# Patient Record
Sex: FEMALE | Race: WHITE | Employment: OTHER | URBAN - METROPOLITAN AREA
[De-identification: names, ages, dates, MRNs, and addresses within clinical notes are randomized per-mention and may not be internally consistent; named-entity substitution may affect disease eponyms.]

---

## 2017-04-06 PROBLEM — H35.3132 INTERMEDIATE STAGE NONEXUDATIVE AGE-RELATED MACULAR DEGENERATION OF BOTH EYES: Status: ACTIVE | Noted: 2017-04-06

## 2017-05-01 PROBLEM — I77.9 BILATERAL CAROTID ARTERY DISEASE (HCC): Status: ACTIVE | Noted: 2017-05-01

## 2017-05-01 PROBLEM — I11.0 HYPERTENSIVE CHF (CONGESTIVE HEART FAILURE) (HCC): Status: ACTIVE | Noted: 2017-05-01

## 2017-05-01 PROBLEM — I70.0 AORTIC ATHEROSCLEROSIS (HCC): Status: ACTIVE | Noted: 2017-05-01

## 2018-03-16 PROBLEM — M19.049: Status: ACTIVE | Noted: 2018-03-16

## 2018-04-09 PROCEDURE — 86200 CCP ANTIBODY: CPT | Performed by: INTERNAL MEDICINE

## 2018-04-20 PROCEDURE — 86803 HEPATITIS C AB TEST: CPT | Performed by: INTERNAL MEDICINE

## 2018-04-20 PROCEDURE — 87340 HEPATITIS B SURFACE AG IA: CPT | Performed by: INTERNAL MEDICINE

## 2018-04-20 PROCEDURE — 86704 HEP B CORE ANTIBODY TOTAL: CPT | Performed by: INTERNAL MEDICINE

## 2018-05-31 PROBLEM — M05.9 SEROPOSITIVE RHEUMATOID ARTHRITIS (HCC): Status: ACTIVE | Noted: 2018-05-31

## 2018-05-31 PROBLEM — I27.20 PULMONARY HTN (HCC): Status: ACTIVE | Noted: 2018-05-31

## 2018-06-05 PROBLEM — H35.30 MACULAR DEGENERATION, UNSPECIFIED LATERALITY, UNSPECIFIED TYPE: Status: ACTIVE | Noted: 2018-06-05

## 2018-06-05 PROBLEM — I27.20 PULMONARY HTN (HCC): Status: RESOLVED | Noted: 2018-05-31 | Resolved: 2018-06-05

## 2018-06-05 PROBLEM — M19.049: Status: RESOLVED | Noted: 2018-03-16 | Resolved: 2018-06-05

## 2018-07-12 PROCEDURE — 82607 VITAMIN B-12: CPT | Performed by: INTERNAL MEDICINE

## 2018-08-15 PROCEDURE — 86480 TB TEST CELL IMMUN MEASURE: CPT | Performed by: INTERNAL MEDICINE

## 2018-08-27 PROCEDURE — 86480 TB TEST CELL IMMUN MEASURE: CPT | Performed by: INTERNAL MEDICINE

## 2019-02-27 ENCOUNTER — LAB ENCOUNTER (OUTPATIENT)
Dept: LAB | Age: 74
End: 2019-02-27
Attending: ORTHOPAEDIC SURGERY
Payer: MEDICARE

## 2019-02-27 DIAGNOSIS — Z01.812 PRE-OPERATIVE LABORATORY EXAMINATION: ICD-10-CM

## 2019-02-27 DIAGNOSIS — M19.011 PRIMARY OSTEOARTHRITIS OF RIGHT SHOULDER: ICD-10-CM

## 2019-02-27 LAB
ALBUMIN SERPL-MCNC: 3.4 G/DL (ref 3.4–5)
ALBUMIN/GLOB SERPL: 0.9 {RATIO} (ref 1–2)
ALP LIVER SERPL-CCNC: 83 U/L (ref 55–142)
ALT SERPL-CCNC: 18 U/L (ref 13–56)
ANION GAP SERPL CALC-SCNC: 6 MMOL/L (ref 0–18)
AST SERPL-CCNC: 18 U/L (ref 15–37)
BILIRUB SERPL-MCNC: 0.4 MG/DL (ref 0.1–2)
BUN BLD-MCNC: 34 MG/DL (ref 7–18)
BUN/CREAT SERPL: 38.2 (ref 10–20)
CALCIUM BLD-MCNC: 9.1 MG/DL (ref 8.5–10.1)
CHLORIDE SERPL-SCNC: 111 MMOL/L (ref 98–107)
CO2 SERPL-SCNC: 24 MMOL/L (ref 21–32)
CREAT BLD-MCNC: 0.89 MG/DL (ref 0.55–1.02)
GLOBULIN PLAS-MCNC: 3.6 G/DL (ref 2.8–4.4)
GLUCOSE BLD-MCNC: 92 MG/DL (ref 70–99)
M PROTEIN MFR SERPL ELPH: 7 G/DL (ref 6.4–8.2)
OSMOLALITY SERPL CALC.SUM OF ELEC: 299 MOSM/KG (ref 275–295)
POTASSIUM SERPL-SCNC: 4.6 MMOL/L (ref 3.5–5.1)
SODIUM SERPL-SCNC: 141 MMOL/L (ref 136–145)

## 2019-02-27 PROCEDURE — 86900 BLOOD TYPING SEROLOGIC ABO: CPT

## 2019-02-27 PROCEDURE — 80053 COMPREHEN METABOLIC PANEL: CPT

## 2019-02-27 PROCEDURE — 86901 BLOOD TYPING SEROLOGIC RH(D): CPT

## 2019-02-27 PROCEDURE — 86850 RBC ANTIBODY SCREEN: CPT

## 2019-02-27 PROCEDURE — 36415 COLL VENOUS BLD VENIPUNCTURE: CPT

## 2019-02-28 LAB
ANTIBODY SCREEN: NEGATIVE
RH BLOOD TYPE: NEGATIVE

## 2019-03-11 RX ORDER — ACETAMINOPHEN 500 MG
1000 TABLET ORAL ONCE
Status: CANCELLED | OUTPATIENT
Start: 2019-03-11 | End: 2019-03-11

## 2019-03-25 ENCOUNTER — LAB ENCOUNTER (OUTPATIENT)
Dept: LAB | Age: 74
End: 2019-03-25
Attending: ORTHOPAEDIC SURGERY
Payer: MEDICARE

## 2019-03-25 DIAGNOSIS — M19.011 PRIMARY OSTEOARTHRITIS OF RIGHT SHOULDER: ICD-10-CM

## 2019-03-25 DIAGNOSIS — M05.9 SEROPOSITIVE RHEUMATOID ARTHRITIS (HCC): ICD-10-CM

## 2019-03-25 DIAGNOSIS — Z79.01 LONG TERM CURRENT USE OF ANTICOAGULANT THERAPY: ICD-10-CM

## 2019-03-25 DIAGNOSIS — D50.9 IRON DEFICIENCY ANEMIA, UNSPECIFIED IRON DEFICIENCY ANEMIA TYPE: ICD-10-CM

## 2019-03-25 DIAGNOSIS — Z51.81 ENCOUNTER FOR MEDICATION MONITORING: ICD-10-CM

## 2019-03-25 DIAGNOSIS — Z95.2 S/P AVR: ICD-10-CM

## 2019-03-25 LAB
ALBUMIN SERPL-MCNC: 3.6 G/DL (ref 3.4–5)
ALP LIVER SERPL-CCNC: 89 U/L (ref 55–142)
ALT SERPL-CCNC: 19 U/L (ref 13–56)
AST SERPL-CCNC: 19 U/L (ref 15–37)
BASOPHILS # BLD AUTO: 0.03 X10(3) UL (ref 0–0.2)
BASOPHILS NFR BLD AUTO: 0.7 %
BILIRUB DIRECT SERPL-MCNC: <0.1 MG/DL (ref 0–0.2)
BILIRUB SERPL-MCNC: 0.4 MG/DL (ref 0.1–2)
CHLORIDE SERPL-SCNC: 109 MMOL/L (ref 98–107)
CO2 SERPL-SCNC: 29 MMOL/L (ref 21–32)
CREAT BLD-MCNC: 0.76 MG/DL (ref 0.55–1.02)
DEPRECATED HBV CORE AB SER IA-ACNC: 72.9 NG/ML (ref 18–340)
DEPRECATED RDW RBC AUTO: 51.3 FL (ref 35.1–46.3)
EOSINOPHIL # BLD AUTO: 0.17 X10(3) UL (ref 0–0.7)
EOSINOPHIL NFR BLD AUTO: 4 %
ERYTHROCYTE [DISTWIDTH] IN BLOOD BY AUTOMATED COUNT: 14.1 % (ref 11–15)
HCT VFR BLD AUTO: 35.7 % (ref 35–48)
HGB BLD-MCNC: 11.3 G/DL (ref 12–16)
IMM GRANULOCYTES # BLD AUTO: 0.01 X10(3) UL (ref 0–1)
IMM GRANULOCYTES NFR BLD: 0.2 %
IRON SATURATION: 48 % (ref 15–50)
IRON SERPL-MCNC: 125 UG/DL (ref 50–170)
LYMPHOCYTES # BLD AUTO: 0.87 X10(3) UL (ref 1–4)
LYMPHOCYTES NFR BLD AUTO: 20.4 %
M PROTEIN MFR SERPL ELPH: 7.4 G/DL (ref 6.4–8.2)
MCH RBC QN AUTO: 31.7 PG (ref 26–34)
MCHC RBC AUTO-ENTMCNC: 31.7 G/DL (ref 31–37)
MCV RBC AUTO: 100.3 FL (ref 80–100)
MONOCYTES # BLD AUTO: 0.42 X10(3) UL (ref 0.1–1)
MONOCYTES NFR BLD AUTO: 9.8 %
NEUTROPHILS # BLD AUTO: 2.77 X10 (3) UL (ref 1.5–7.7)
NEUTROPHILS # BLD AUTO: 2.77 X10(3) UL (ref 1.5–7.7)
NEUTROPHILS NFR BLD AUTO: 64.9 %
PLATELET # BLD AUTO: 301 10(3)UL (ref 150–450)
POTASSIUM SERPL-SCNC: 4.8 MMOL/L (ref 3.5–5.1)
RBC # BLD AUTO: 3.56 X10(6)UL (ref 3.8–5.3)
SODIUM SERPL-SCNC: 141 MMOL/L (ref 136–145)
TOTAL IRON BINDING CAPACITY: 258 UG/DL (ref 240–450)
TRANSFERRIN SERPL-MCNC: 173 MG/DL (ref 200–360)
WBC # BLD AUTO: 4.3 X10(3) UL (ref 4–11)

## 2019-03-25 PROCEDURE — 82565 ASSAY OF CREATININE: CPT

## 2019-03-25 PROCEDURE — 82728 ASSAY OF FERRITIN: CPT

## 2019-03-25 PROCEDURE — 86900 BLOOD TYPING SEROLOGIC ABO: CPT

## 2019-03-25 PROCEDURE — 87081 CULTURE SCREEN ONLY: CPT

## 2019-03-25 PROCEDURE — 84466 ASSAY OF TRANSFERRIN: CPT

## 2019-03-25 PROCEDURE — 85025 COMPLETE CBC W/AUTO DIFF WBC: CPT

## 2019-03-25 PROCEDURE — 36415 COLL VENOUS BLD VENIPUNCTURE: CPT

## 2019-03-25 PROCEDURE — 86850 RBC ANTIBODY SCREEN: CPT

## 2019-03-25 PROCEDURE — 86901 BLOOD TYPING SEROLOGIC RH(D): CPT

## 2019-03-25 PROCEDURE — 83540 ASSAY OF IRON: CPT

## 2019-03-25 PROCEDURE — 80076 HEPATIC FUNCTION PANEL: CPT

## 2019-03-25 PROCEDURE — 80051 ELECTROLYTE PANEL: CPT

## 2019-03-26 LAB
ANTIBODY SCREEN: NEGATIVE
RH BLOOD TYPE: NEGATIVE

## 2019-03-27 ENCOUNTER — ANESTHESIA EVENT (OUTPATIENT)
Dept: SURGERY | Facility: HOSPITAL | Age: 74
DRG: 483 | End: 2019-03-27
Payer: MEDICARE

## 2019-04-08 ENCOUNTER — APPOINTMENT (OUTPATIENT)
Dept: GENERAL RADIOLOGY | Facility: HOSPITAL | Age: 74
DRG: 483 | End: 2019-04-08
Attending: ORTHOPAEDIC SURGERY
Payer: MEDICARE

## 2019-04-08 ENCOUNTER — ANESTHESIA (OUTPATIENT)
Dept: SURGERY | Facility: HOSPITAL | Age: 74
DRG: 483 | End: 2019-04-08
Payer: MEDICARE

## 2019-04-08 ENCOUNTER — HOSPITAL ENCOUNTER (INPATIENT)
Facility: HOSPITAL | Age: 74
LOS: 2 days | Discharge: HOME HEALTH | DRG: 483 | End: 2019-04-10
Attending: ORTHOPAEDIC SURGERY | Admitting: ORTHOPAEDIC SURGERY
Payer: MEDICARE

## 2019-04-08 DIAGNOSIS — M19.011 PRIMARY OSTEOARTHRITIS OF RIGHT SHOULDER: Primary | ICD-10-CM

## 2019-04-08 PROCEDURE — 73020 X-RAY EXAM OF SHOULDER: CPT | Performed by: ORTHOPAEDIC SURGERY

## 2019-04-08 PROCEDURE — 88305 TISSUE EXAM BY PATHOLOGIST: CPT | Performed by: ORTHOPAEDIC SURGERY

## 2019-04-08 PROCEDURE — 88311 DECALCIFY TISSUE: CPT | Performed by: ORTHOPAEDIC SURGERY

## 2019-04-08 PROCEDURE — 0RRJ00Z REPLACEMENT OF RIGHT SHOULDER JOINT WITH REVERSE BALL AND SOCKET SYNTHETIC SUBSTITUTE, OPEN APPROACH: ICD-10-PCS | Performed by: ORTHOPAEDIC SURGERY

## 2019-04-08 PROCEDURE — 85610 PROTHROMBIN TIME: CPT

## 2019-04-08 PROCEDURE — 3E0T3BZ INTRODUCTION OF ANESTHETIC AGENT INTO PERIPHERAL NERVES AND PLEXI, PERCUTANEOUS APPROACH: ICD-10-PCS | Performed by: ANESTHESIOLOGY

## 2019-04-08 PROCEDURE — 76942 ECHO GUIDE FOR BIOPSY: CPT | Performed by: ORTHOPAEDIC SURGERY

## 2019-04-08 RX ORDER — HYDROCODONE BITARTRATE AND ACETAMINOPHEN 10; 325 MG/1; MG/1
1 TABLET ORAL AS NEEDED
Status: DISCONTINUED | OUTPATIENT
Start: 2019-04-08 | End: 2019-04-08 | Stop reason: HOSPADM

## 2019-04-08 RX ORDER — OXYCODONE HYDROCHLORIDE 5 MG/1
5 TABLET ORAL EVERY 4 HOURS PRN
Status: DISPENSED | OUTPATIENT
Start: 2019-04-08 | End: 2019-04-09

## 2019-04-08 RX ORDER — SODIUM CHLORIDE, SODIUM LACTATE, POTASSIUM CHLORIDE, CALCIUM CHLORIDE 600; 310; 30; 20 MG/100ML; MG/100ML; MG/100ML; MG/100ML
INJECTION, SOLUTION INTRAVENOUS CONTINUOUS
Status: DISCONTINUED | OUTPATIENT
Start: 2019-04-08 | End: 2019-04-10

## 2019-04-08 RX ORDER — METOCLOPRAMIDE HYDROCHLORIDE 5 MG/ML
10 INJECTION INTRAMUSCULAR; INTRAVENOUS AS NEEDED
Status: DISCONTINUED | OUTPATIENT
Start: 2019-04-08 | End: 2019-04-08 | Stop reason: HOSPADM

## 2019-04-08 RX ORDER — VANCOMYCIN HYDROCHLORIDE 1 G/20ML
INJECTION, POWDER, LYOPHILIZED, FOR SOLUTION INTRAVENOUS AS NEEDED
Status: DISCONTINUED | OUTPATIENT
Start: 2019-04-08 | End: 2019-04-08 | Stop reason: HOSPADM

## 2019-04-08 RX ORDER — ERGOCALCIFEROL (VITAMIN D2) 10 MCG
400 TABLET ORAL DAILY
Status: DISCONTINUED | OUTPATIENT
Start: 2019-04-08 | End: 2019-04-10

## 2019-04-08 RX ORDER — POTASSIUM CITRATE 5 MEQ/1
10 TABLET, EXTENDED RELEASE ORAL DAILY
Status: DISCONTINUED | OUTPATIENT
Start: 2019-04-08 | End: 2019-04-10

## 2019-04-08 RX ORDER — METHOTREXATE 25 MG/ML
15 INJECTION, SOLUTION INTRA-ARTERIAL; INTRAMUSCULAR; INTRAVENOUS
Status: DISCONTINUED | OUTPATIENT
Start: 2019-04-08 | End: 2019-04-10

## 2019-04-08 RX ORDER — MEPERIDINE HYDROCHLORIDE 25 MG/ML
12.5 INJECTION INTRAMUSCULAR; INTRAVENOUS; SUBCUTANEOUS AS NEEDED
Status: DISCONTINUED | OUTPATIENT
Start: 2019-04-08 | End: 2019-04-08 | Stop reason: HOSPADM

## 2019-04-08 RX ORDER — OXYCODONE HYDROCHLORIDE 5 MG/1
5 TABLET ORAL EVERY 4 HOURS PRN
Status: DISCONTINUED | OUTPATIENT
Start: 2019-04-08 | End: 2019-04-08

## 2019-04-08 RX ORDER — ACETAMINOPHEN 325 MG/1
650 TABLET ORAL EVERY 6 HOURS PRN
Status: DISCONTINUED | OUTPATIENT
Start: 2019-04-08 | End: 2019-04-08 | Stop reason: HOSPADM

## 2019-04-08 RX ORDER — HYDROCODONE BITARTRATE AND ACETAMINOPHEN 10; 325 MG/1; MG/1
2 TABLET ORAL AS NEEDED
Status: DISCONTINUED | OUTPATIENT
Start: 2019-04-08 | End: 2019-04-08 | Stop reason: HOSPADM

## 2019-04-08 RX ORDER — FOLIC ACID 1 MG/1
1 TABLET ORAL DAILY
Status: DISCONTINUED | OUTPATIENT
Start: 2019-04-08 | End: 2019-04-10

## 2019-04-08 RX ORDER — OXYCODONE HYDROCHLORIDE 10 MG/1
10 TABLET ORAL EVERY 4 HOURS PRN
Status: DISCONTINUED | OUTPATIENT
Start: 2019-04-08 | End: 2019-04-08

## 2019-04-08 RX ORDER — DIPHENHYDRAMINE HYDROCHLORIDE 50 MG/ML
25 INJECTION INTRAMUSCULAR; INTRAVENOUS ONCE AS NEEDED
Status: ACTIVE | OUTPATIENT
Start: 2019-04-08 | End: 2019-04-08

## 2019-04-08 RX ORDER — ENOXAPARIN SODIUM 100 MG/ML
40 INJECTION SUBCUTANEOUS DAILY
Status: DISCONTINUED | OUTPATIENT
Start: 2019-04-08 | End: 2019-04-09

## 2019-04-08 RX ORDER — OXYCODONE HYDROCHLORIDE 15 MG/1
15 TABLET ORAL EVERY 4 HOURS PRN
Status: ACTIVE | OUTPATIENT
Start: 2019-04-08 | End: 2019-04-09

## 2019-04-08 RX ORDER — DOCUSATE SODIUM 100 MG/1
100 CAPSULE, LIQUID FILLED ORAL 2 TIMES DAILY
Status: DISCONTINUED | OUTPATIENT
Start: 2019-04-08 | End: 2019-04-10

## 2019-04-08 RX ORDER — HYDROMORPHONE HYDROCHLORIDE 1 MG/ML
0.8 INJECTION, SOLUTION INTRAMUSCULAR; INTRAVENOUS; SUBCUTANEOUS EVERY 2 HOUR PRN
Status: ACTIVE | OUTPATIENT
Start: 2019-04-08 | End: 2019-04-10

## 2019-04-08 RX ORDER — METOCLOPRAMIDE HYDROCHLORIDE 5 MG/ML
10 INJECTION INTRAMUSCULAR; INTRAVENOUS EVERY 6 HOURS PRN
Status: ACTIVE | OUTPATIENT
Start: 2019-04-08 | End: 2019-04-10

## 2019-04-08 RX ORDER — CEFAZOLIN SODIUM/WATER 2 G/20 ML
2 SYRINGE (ML) INTRAVENOUS EVERY 8 HOURS
Status: COMPLETED | OUTPATIENT
Start: 2019-04-08 | End: 2019-04-08

## 2019-04-08 RX ORDER — SODIUM PHOSPHATE, DIBASIC AND SODIUM PHOSPHATE, MONOBASIC 7; 19 G/133ML; G/133ML
1 ENEMA RECTAL ONCE AS NEEDED
Status: DISCONTINUED | OUTPATIENT
Start: 2019-04-08 | End: 2019-04-10

## 2019-04-08 RX ORDER — ONDANSETRON 2 MG/ML
4 INJECTION INTRAMUSCULAR; INTRAVENOUS EVERY 4 HOURS PRN
Status: ACTIVE | OUTPATIENT
Start: 2019-04-08 | End: 2019-04-10

## 2019-04-08 RX ORDER — CEFAZOLIN SODIUM/WATER 2 G/20 ML
2 SYRINGE (ML) INTRAVENOUS ONCE
Status: COMPLETED | OUTPATIENT
Start: 2019-04-08 | End: 2019-04-08

## 2019-04-08 RX ORDER — HYDROCODONE BITARTRATE AND ACETAMINOPHEN 10; 325 MG/1; MG/1
2 TABLET ORAL EVERY 4 HOURS PRN
Status: DISCONTINUED | OUTPATIENT
Start: 2019-04-08 | End: 2019-04-10

## 2019-04-08 RX ORDER — NALOXONE HYDROCHLORIDE 0.4 MG/ML
80 INJECTION, SOLUTION INTRAMUSCULAR; INTRAVENOUS; SUBCUTANEOUS AS NEEDED
Status: DISCONTINUED | OUTPATIENT
Start: 2019-04-08 | End: 2019-04-08 | Stop reason: HOSPADM

## 2019-04-08 RX ORDER — HYDROCODONE BITARTRATE AND ACETAMINOPHEN 10; 325 MG/1; MG/1
1 TABLET ORAL EVERY 4 HOURS PRN
Status: DISCONTINUED | OUTPATIENT
Start: 2019-04-08 | End: 2019-04-10

## 2019-04-08 RX ORDER — MULTIVITAMIN/IRON/FOLIC ACID 18MG-0.4MG
250 TABLET ORAL DAILY
Status: DISCONTINUED | OUTPATIENT
Start: 2019-04-08 | End: 2019-04-10

## 2019-04-08 RX ORDER — ONDANSETRON 2 MG/ML
4 INJECTION INTRAMUSCULAR; INTRAVENOUS AS NEEDED
Status: DISCONTINUED | OUTPATIENT
Start: 2019-04-08 | End: 2019-04-08 | Stop reason: HOSPADM

## 2019-04-08 RX ORDER — BISACODYL 10 MG
10 SUPPOSITORY, RECTAL RECTAL
Status: DISCONTINUED | OUTPATIENT
Start: 2019-04-08 | End: 2019-04-10

## 2019-04-08 RX ORDER — HYDROMORPHONE HYDROCHLORIDE 1 MG/ML
0.4 INJECTION, SOLUTION INTRAMUSCULAR; INTRAVENOUS; SUBCUTANEOUS EVERY 2 HOUR PRN
Status: DISPENSED | OUTPATIENT
Start: 2019-04-08 | End: 2019-04-10

## 2019-04-08 RX ORDER — LISINOPRIL 5 MG/1
5 TABLET ORAL DAILY
Status: DISCONTINUED | OUTPATIENT
Start: 2019-04-08 | End: 2019-04-10

## 2019-04-08 RX ORDER — OXYCODONE HYDROCHLORIDE 10 MG/1
10 TABLET ORAL EVERY 4 HOURS PRN
Status: ACTIVE | OUTPATIENT
Start: 2019-04-08 | End: 2019-04-09

## 2019-04-08 RX ORDER — ACETAMINOPHEN 325 MG/1
650 TABLET ORAL 4 TIMES DAILY
Status: DISCONTINUED | OUTPATIENT
Start: 2019-04-08 | End: 2019-04-08

## 2019-04-08 RX ORDER — METOPROLOL SUCCINATE 25 MG/1
25 TABLET, EXTENDED RELEASE ORAL NIGHTLY
Status: DISCONTINUED | OUTPATIENT
Start: 2019-04-08 | End: 2019-04-10

## 2019-04-08 RX ORDER — TIZANIDINE 2 MG/1
2 TABLET ORAL 3 TIMES DAILY PRN
Status: DISCONTINUED | OUTPATIENT
Start: 2019-04-08 | End: 2019-04-10

## 2019-04-08 RX ORDER — ACETAMINOPHEN 325 MG/1
TABLET ORAL
Status: COMPLETED
Start: 2019-04-08 | End: 2019-04-08

## 2019-04-08 RX ORDER — HYDROMORPHONE HYDROCHLORIDE 1 MG/ML
0.4 INJECTION, SOLUTION INTRAMUSCULAR; INTRAVENOUS; SUBCUTANEOUS EVERY 5 MIN PRN
Status: DISCONTINUED | OUTPATIENT
Start: 2019-04-08 | End: 2019-04-08 | Stop reason: HOSPADM

## 2019-04-08 RX ORDER — WARFARIN SODIUM 5 MG/1
5 TABLET ORAL NIGHTLY
Status: DISCONTINUED | OUTPATIENT
Start: 2019-04-08 | End: 2019-04-09

## 2019-04-08 RX ORDER — OXYCODONE HYDROCHLORIDE 15 MG/1
15 TABLET ORAL EVERY 4 HOURS PRN
Status: DISCONTINUED | OUTPATIENT
Start: 2019-04-08 | End: 2019-04-08

## 2019-04-08 RX ORDER — VITAMIN E 268 MG
400 CAPSULE ORAL DAILY
Status: DISCONTINUED | OUTPATIENT
Start: 2019-04-08 | End: 2019-04-10

## 2019-04-08 RX ORDER — POLYETHYLENE GLYCOL 3350 17 G/17G
17 POWDER, FOR SOLUTION ORAL DAILY PRN
Status: DISCONTINUED | OUTPATIENT
Start: 2019-04-08 | End: 2019-04-10

## 2019-04-08 RX ORDER — HYDROMORPHONE HYDROCHLORIDE 1 MG/ML
0.2 INJECTION, SOLUTION INTRAMUSCULAR; INTRAVENOUS; SUBCUTANEOUS EVERY 2 HOUR PRN
Status: ACTIVE | OUTPATIENT
Start: 2019-04-08 | End: 2019-04-10

## 2019-04-08 RX ORDER — SODIUM CHLORIDE, SODIUM LACTATE, POTASSIUM CHLORIDE, CALCIUM CHLORIDE 600; 310; 30; 20 MG/100ML; MG/100ML; MG/100ML; MG/100ML
INJECTION, SOLUTION INTRAVENOUS CONTINUOUS
Status: DISCONTINUED | OUTPATIENT
Start: 2019-04-08 | End: 2019-04-08 | Stop reason: HOSPADM

## 2019-04-08 RX ORDER — FLUTICASONE PROPIONATE 50 MCG
1 SPRAY, SUSPENSION (ML) NASAL DAILY
Status: DISCONTINUED | OUTPATIENT
Start: 2019-04-08 | End: 2019-04-10

## 2019-04-08 RX ORDER — UBIDECARENONE 75 MG
100 CAPSULE ORAL DAILY
Status: DISCONTINUED | OUTPATIENT
Start: 2019-04-08 | End: 2019-04-10

## 2019-04-08 RX ORDER — ACETAMINOPHEN 500 MG
1000 TABLET ORAL ONCE
COMMUNITY

## 2019-04-08 RX ORDER — HYDROCODONE BITARTRATE AND ACETAMINOPHEN 5; 325 MG/1; MG/1
1 TABLET ORAL EVERY 4 HOURS PRN
Status: DISCONTINUED | OUTPATIENT
Start: 2019-04-08 | End: 2019-04-10

## 2019-04-08 RX ORDER — FUROSEMIDE 20 MG/1
20 TABLET ORAL DAILY
Status: DISCONTINUED | OUTPATIENT
Start: 2019-04-08 | End: 2019-04-10

## 2019-04-08 RX ORDER — ACETAMINOPHEN 325 MG/1
650 TABLET ORAL 4 TIMES DAILY
Status: DISPENSED | OUTPATIENT
Start: 2019-04-08 | End: 2019-04-09

## 2019-04-08 NOTE — OPERATIVE REPORT
Operative Note    Patient: Raymond Abbott MRN: [id number]     YOB: 1945  Age: 68year old  Sex: female    Unit: [facility] PACU Room/Bed: [facility] PACU/ PACU Pool Location: [facility]     Date of Procedure: 4/8/2019     Preoperative Diagnosis:   1. Signed By: Jovanni Whitt 4/8/2019 10:52 AM    Indications for Procedure: The patient is a 77-year-old female who is right-hand dominant and has a history of chronic right shoulder pain.   I saw her at the beginning the year for x-rays revealed sever increase her ability to perform ADLs, and removing her arthritis from the shoulder. In addition to this I also discussed with her specific comorbidities including rheumatoid arthritis and being on Humira.   Based on guidelines from the [employer] of general anesthesia. We then positioned the patient upright to about 45 degrees and slide the patient over so her shoulder girdle was free, head was turned to the left and secured. All the patient's bony prominences were well-padded.   SCDs were placed on see a significant amount of joint fluid extravasate out consistent with a full-thickness rotator cuff tear. I then took a Fukuda retractor in place around the back of the humeral head to bring it forward which also nicely retracted my deltoid posteriorly. off the inferior tendon and capsule directly off the bone. This allowed me to dislocate the humeral head out anteriorly.   I placed multiple retractors around the humeral head and then used a curved osteotome to take off the large inferior osteophyte that tendon and exposure of the anterior glenoid. Once I visualized the glenoid, I saw that the entire labrum was gone circumferentially. She did have a significant amount of injected synovitic tissue which is typical of rheumatoid arthritic patients.   I did rotation. I also test the patient with internal rotation at the side and did not feel any issues with stability. I was satisfied with the current construct and removed all the trial components in the humerus.   I placed 3 #5 heavy nonabsorbable braided mckeon Subcutaneous tissue was closed with interrupted 3-0 buried Vicryl. The skin was closed with a running alternating 3-0 Monocryl suture. Dermabond was applied to the skin and after the dried longitudinal Steri-Strips were placed on that.   An Arimaz

## 2019-04-08 NOTE — INTERVAL H&P NOTE
Pre-op Diagnosis: Primary osteoarthritis of right shoulder [M19.011]    The above referenced H&P was reviewed by Jovanni Whitt MD on 4/8/2019, the patient was examined and no significant changes have occurred in the patient's condition since the H&P was

## 2019-04-08 NOTE — ANESTHESIA PREPROCEDURE EVALUATION
PRE-OP EVALUATION    Patient Name: Chad Gonzalez    Pre-op Diagnosis: Primary osteoarthritis of right shoulder [M19.011]    Procedure(s):  RIGHT REVERSE TOTAL SHOULDER ARTHROPLASTY    Surgeon(s) and Role:     Kavitha Ovalles MD - Primary    Pre-op Oral Tab Take 1 tablet (1 mg total) by mouth daily. Disp: 90 tablet Rfl: 3   amoxicillin 500 MG Oral Cap Take 4 capsules 1 hour prior to dental work.  Disp: 4 capsule Rfl: 3   Triamcinolone Acetonide (NASACORT ALLERGY 24HR NA) by Nasal route daily as needed Alcohol use: Yes      Comment: Few glasses of wine/mo      Drug use: No     Available pre-op labs reviewed.   Lab Results   Component Value Date    WBC 4.3 03/25/2019    WBC 7.58 02/07/2019    RBC 3.56 (L) 03/25/2019    RBC 3.54 (L) 02/07/2019    HGB 11.3 (

## 2019-04-08 NOTE — BRIEF OP NOTE
Pre-Operative Diagnosis: Primary osteoarthritis of right shoulder [M19.011]     Post-Operative Diagnosis: Primary osteoarthritis of right shoulder [M19.011]      Procedure Performed:   Procedure(s):  RIGHT REVERSE TOTAL SHOULDER ARTHROPLASTY  RIGHT OPEN BI

## 2019-04-08 NOTE — PROGRESS NOTES
This is an addendum note to the preoperative regional block note. An ultrasound device was used to precisely deposit ropivacaine 0.5% local anesthetic in order to avoid neural structures and to avoid intravascular injection.  The procedure was performed wit

## 2019-04-08 NOTE — H&P
Neeta Delvalle  4/5/2019      CHIEF COMPLAINT     Patient presents with:  Pre-Op: pre-op appt for RTSA on 4/8/2019 at [facility].          HISTORY OF PRESENT ILLNESS     Neeta Delvalle is a 68year old right hand dominant  female who is here for pre-ope Medications:    Medications Taking      No outpatient medications have been marked as taking for the 4/5/19 encounter (Office Visit) with German Zimmer MD.        Past Surgical History:         Past Surgical History:   Procedure Laterality Date   • LEILA or palpitations  GI:  Denies abdominal pain, nausea, vomiting, or diarrhea   Skin:  Denies rash   Neurologic:  Denies headache, focal weakness or sensory changes   Musculoskeletal:  As described in HPI     PHYSICAL EXAMINATION       04/05/19  0819   Pulse: 150.0 - 450.0 10(3)uL 301.0    Neutrophil Absolute Prelim 1.50 - 7.70 x10 (3) uL 2.77    Neutrophil Absolute 1.50 - 7.70 x10(3) uL 2.77    Lymphocyte Absolute 1.00 - 4.00 x10(3) uL 0.87 Abnormally low     Monocyte Absolute 0.10 - 1.00 x10(3) uL 0.42    Eos Sometimes may do surgery we find that the rotator cuff is intact and then we can do an anatomic shoulder however the likelihood of that is very low for her.   The risk and benefits of doing a reverse total shoulder arthroplasty include but are not limited t

## 2019-04-08 NOTE — ANESTHESIA POSTPROCEDURE EVALUATION
[id number]  [facility] Patient Status:  Surgery Admit   Age/Gender 68year old female MRN [id number]   Location [facility] Attending Alize Lynn MD   Hosp Day # 0 PCP Dewey Lou MD       Anesthesia P

## 2019-04-08 NOTE — PLAN OF CARE
Problem: Patient/Family Goals  Goal: Patient/Family Long Term Goal  Description  Patient's Long Term Goal: \"Be able to use my right arm more, increase my activity once I'm healed\"    Interventions:  - Follow Pt/Ot precautions and instructions, take remington Progressing  4/8/2019 1637 by Sal Wood RN  Outcome: Progressing  4/8/2019 1635 by Sal Wood RN  Outcome: Progressing  4/8/2019 1632 by Sal Wood RN  Outcome: Progressing  Goal: Maintain proper alignment of affected body part  Joseph Colunga distraction and/or relaxation techniques  - Monitor for opioid side effects  - Notify MD/LIP if interventions unsuccessful or patient reports new pain  - Anticipate increased pain with activity and pre-medicate as appropriate  4/8/2019 1850 by Sanjeev Estrella

## 2019-04-08 NOTE — CONSULTS
General Medicine Consult      Consulted by: Iveth Mendieta MD    PCP: Anny Mosher MD    Reason for Consultation: Medical Management    History of Present Illness: Patient is a 68year old female with PMH including but not limited to HTN, RA who p/t docusate sodium 100 mg Oral BID   enoxaparin 40 mg Subcutaneous Daily   ceFAZolin 2 g Intravenous Q8H   acetaminophen 650 mg Oral QID       ALL:    Ibuprofen               OTHER (SEE COMMENTS)    Comment:Taking Coumadin  Adhesive Tape           RASH  Garrett (CPT=[id number])  TECHNIQUE:  AP view of the shoulder was obtained. COMPARISON:  None. INDICATIONS:  post-op film  PATIENT STATED HISTORY: (As transcribed by Technologist)  Patient offered no additional history at this time.     FINDINGS:  Single view of the r pain, expected  -IV pain meds as needed, will monitor and encourage transition to PO pain meds as tolerated  -Bowel regimen for associated narcotic constipation    # HTN  -BB, diuretic    # RA  -MTX    # AVR.  DVT proph  -coumadin, on proph lovenox for now

## 2019-04-08 NOTE — PROGRESS NOTES
Patient received post op. Vitals stable. Denies pain. Voided and had a large bm upon arrival to floor via bedside commode, tolerated well. Rt shoulder aquacell drsg remains clean and dry. Gel ice pack on. Immobilizer on and in place.  Post op plan of care d

## 2019-04-09 PROCEDURE — 85610 PROTHROMBIN TIME: CPT | Performed by: ORTHOPAEDIC SURGERY

## 2019-04-09 PROCEDURE — 85027 COMPLETE CBC AUTOMATED: CPT | Performed by: ORTHOPAEDIC SURGERY

## 2019-04-09 PROCEDURE — 97161 PT EVAL LOW COMPLEX 20 MIN: CPT

## 2019-04-09 PROCEDURE — 97535 SELF CARE MNGMENT TRAINING: CPT

## 2019-04-09 PROCEDURE — 97165 OT EVAL LOW COMPLEX 30 MIN: CPT

## 2019-04-09 PROCEDURE — 80048 BASIC METABOLIC PNL TOTAL CA: CPT | Performed by: ORTHOPAEDIC SURGERY

## 2019-04-09 PROCEDURE — 97530 THERAPEUTIC ACTIVITIES: CPT

## 2019-04-09 PROCEDURE — 97116 GAIT TRAINING THERAPY: CPT

## 2019-04-09 RX ORDER — HYDROCODONE BITARTRATE AND ACETAMINOPHEN 5; 325 MG/1; MG/1
TABLET ORAL
Qty: 50 TABLET | Refills: 0 | Status: SHIPPED | OUTPATIENT
Start: 2019-04-09 | End: 2019-08-14 | Stop reason: ALTCHOICE

## 2019-04-09 RX ORDER — ENOXAPARIN SODIUM 100 MG/ML
40 INJECTION SUBCUTANEOUS 2 TIMES DAILY
Qty: 5.6 ML | Refills: 0 | Status: SHIPPED | OUTPATIENT
Start: 2019-04-09 | End: 2019-04-16

## 2019-04-09 RX ORDER — ENOXAPARIN SODIUM 100 MG/ML
40 INJECTION SUBCUTANEOUS EVERY 12 HOURS SCHEDULED
Status: DISCONTINUED | OUTPATIENT
Start: 2019-04-09 | End: 2019-04-10

## 2019-04-09 RX ORDER — WARFARIN SODIUM 5 MG/1
5 TABLET ORAL
Status: COMPLETED | OUTPATIENT
Start: 2019-04-09 | End: 2019-04-09

## 2019-04-09 NOTE — CM/SW NOTE
69 yo sp reverse total shoulder replacement. Post op order for home health versus possibe rehab. PT/OT recommending home discharge plan. Referred to Kath[facility] Trevi[facility] and she has met with pt.  / to remain av

## 2019-04-09 NOTE — PROGRESS NOTES
S: No new issues. Block has worn off and she has pain. No issues otherwise.      O: NAD, AAOx3     04/09/19  0731   BP: 149/62   Pulse: 61   Resp: 18   Temp: 98.8 °F (37.1 °C)     Lab Results   Component Value Date    WBC 7.6 04/09/2019    HGB 9.6 04/09/201

## 2019-04-09 NOTE — PROGRESS NOTES
Post Op Day 1 Ortho Note    Status Post Nerve Block:  Type of Nerve Block: Right Interscalene  Single Injection Nerve Block    Post op review: No evidence of immediate block related complications and No paresthesia noted    Patient states her pain is 8/10

## 2019-04-09 NOTE — OCCUPATIONAL THERAPY NOTE
OCCUPATIONAL THERAPY EVALUATION - INPATIENT     Room Number: 364/364-A  Evaluation Date: 4/9/2019  Type of Evaluation: Initial  Presenting Problem: s/p R reverse TSA, R open biceps tenodesis 4/8/19    Physician Order: IP Consult to Occupational Therapy  Re functional mobility without device prior to admission. SUBJECTIVE   \"I've been using my L hand for a while\"    Patient self-stated goal is to return home. OBJECTIVE  Precautions:  Other (Comment)(R shoulder immobilizer)  Fall Risk: Standard fall ri ASSESSMENT  Supine to Sit : Not tested(plans to sleep in reclining sofa at d/c)  Sit to Stand: Contact guard assist    Skilled Therapy Provided:  Activities performed this session include: Education on shoulder precautions, NWB status, and incorporation int knowledge of adaptive techniques.  These deficits impact the patient’s ability to participate in lower body dressing/bathing, toileting, toilet transfers, bed mobility, functional mobility, instrumental activities of daily living, rest and sleep, work, [employer]

## 2019-04-09 NOTE — PLAN OF CARE
Problem: Patient/Family Goals  Goal: Patient/Family Long Term Goal  Description  Patient's Long Term Goal: \"Be able to use my right arm more, increase my activity once I'm healed\"    Interventions:  - Follow Pt/Ot precautions and instructions, take remington Problem: PAIN - ADULT  Goal: Verbalizes/displays adequate comfort level or patient's stated pain goal  Description  INTERVENTIONS:  - Encourage pt to monitor pain and request assistance  - Assess pain using appropriate pain scale  - Administer analgesics

## 2019-04-09 NOTE — HOME CARE LIAISON
MET WITH PTNT AND OFFERED CHOICE  OF AGENCIES. PTNT AGREEABLE TO [facility]. MET WITH PTNT TO DISCUSS HOME HEALTH SERVICES AND COVERAGE CRITERIA. PTNT AGREEABLE TO [facility]. PTNT GIVEN RESIDENTIAL BROCHURE.  RESIDENTIAL WITH PROVIDE SN/PT ON DISC

## 2019-04-09 NOTE — PROGRESS NOTES
[facility] Hospitalist Progress Note     PCP: Parviz Hansen MD    Chief Complaint: follow-up    Overnight/Interim Events:      SUBJECTIVE:  Attempted to see pt earlier, talking to staff then using bathroom. Pt doing ok, some pain.      OBJECTIVE:  Temp: Propionate  1 spray Nasal Daily   • folic acid  1 mg Oral Daily   • Metoprolol Succinate ER  25 mg Oral Nightly   • Potassium Citrate ER  10 mEq Oral Daily   • furosemide  20 mg Oral Daily   • Methotrexate Sodium  15 mg Injection Q7 Days   • Warfarin [place] Hospitalist  [phone]  4/9/2019  4:19 PM

## 2019-04-09 NOTE — PLAN OF CARE
Problem: Patient/Family Goals  Goal: Patient/Family Long Term Goal  Description  Patient's Long Term Goal: \"Be able to use my right arm more, increase my activity once I'm healed\"    Interventions:  - Follow Pt/Ot precautions and instructions, take remington non-pharmacological measures as appropriate and evaluate response  - Consider cultural and social influences on pain and pain management  - Manage/alleviate anxiety  - Utilize distraction and/or relaxation techniques  - Monitor for opioid side effects  - N

## 2019-04-09 NOTE — PLAN OF CARE
Problem: Impaired Activities of Daily Living  Goal: Achieve highest/safest level of independence in self care  Description  Interventions:  - Assess ability and encourage patient to participate in ADLs to maximize function  - Promote sitting position i

## 2019-04-09 NOTE — PHYSICAL THERAPY NOTE
PHYSICAL THERAPY EVALUATION - INPATIENT     Room Number: 364/364-A  Evaluation Date: 4/9/2019  Type of Evaluation: Initial  Physician Order: PT Eval and Treat    Presenting Problem: s/p R reverse TSR with biceps tenodesis  Reason for Therapy: Paige Sanchez extremity  R Upper Extremity: Non-Weight Bearing             PAIN ASSESSMENT  Ratin  Location: R shoulder  Management Techniques:  Activity promotion;Relaxation;Repositioning    COGNITION  · Overall Cognitive Status:  WFL - within functional limits    R Provided: evaluation; pt and daughter instructed in the role of PT, POC and D/C plans. Both verbalized understanding. Pt instructed in proper use of immobilizer and daughter instructed in assisting with donning/doffing.  Per MD he would like to immobilizer inpatient PT to address the above deficits to assist patient in returning to prior to level of function. DISCHARGE RECOMMENDATIONS  PT Discharge Recommendations: Home with home health PT    PLAN  PT Treatment Plan: Bed mobility; Endurance; Patient education

## 2019-04-10 VITALS
TEMPERATURE: 99 F | RESPIRATION RATE: 16 BRPM | SYSTOLIC BLOOD PRESSURE: 103 MMHG | HEART RATE: 78 BPM | HEIGHT: 67 IN | BODY MASS INDEX: 30.86 KG/M2 | OXYGEN SATURATION: 98 % | DIASTOLIC BLOOD PRESSURE: 58 MMHG | WEIGHT: 196.63 LBS

## 2019-04-10 PROCEDURE — 97530 THERAPEUTIC ACTIVITIES: CPT

## 2019-04-10 PROCEDURE — 85610 PROTHROMBIN TIME: CPT | Performed by: INTERNAL MEDICINE

## 2019-04-10 PROCEDURE — 97110 THERAPEUTIC EXERCISES: CPT

## 2019-04-10 PROCEDURE — 97535 SELF CARE MNGMENT TRAINING: CPT

## 2019-04-10 PROCEDURE — 80048 BASIC METABOLIC PNL TOTAL CA: CPT | Performed by: INTERNAL MEDICINE

## 2019-04-10 PROCEDURE — 85027 COMPLETE CBC AUTOMATED: CPT | Performed by: ORTHOPAEDIC SURGERY

## 2019-04-10 PROCEDURE — 83735 ASSAY OF MAGNESIUM: CPT | Performed by: INTERNAL MEDICINE

## 2019-04-10 PROCEDURE — 97116 GAIT TRAINING THERAPY: CPT

## 2019-04-10 NOTE — OCCUPATIONAL THERAPY NOTE
OCCUPATIONAL THERAPY TREATMENT NOTE - INPATIENT     Room Number: 364/364-A  Session: 1   Number of Visits to Meet Established Goals: 2    Presenting Problem: s/p R reverse TSA, R open biceps tenodesis 4/8/19    History related to current admission: Patient ACTIVITIES OF DAILY LIVING ASSESSMENT  AM-[facility] ‘6-Clicks’ Inpatient Daily Activity Short Form  How much help from another person does the patient currently need…  -   Putting on and taking off regular lower body clothing?: A Little(supervision)  -   [employer] return demo following NWB with HOB elevated, although reports will sleep in reclining sofa initially.  Patient also educated on OT role, safety, fall prevention, pain maagement, shower transfers (reinforced to have daughter present and recommended shower ch

## 2019-04-10 NOTE — PROGRESS NOTES
Assumed care of pt @ 1730. Pt up in chair, immobilizer/gel ice to SALIMA. Aquacel, cdi. Denies pain. Returned pt to bed, swelling to BLE, elevated pts legs on pillow. SCD's in place. Belongings within reach, pt instructed to call for assistance.

## 2019-04-10 NOTE — PROGRESS NOTES
S: no new issues overnight. She is doing better, pain is controlled with PO meds.      O: NAD, AAOx3       04/10/19  0349   BP: 103/54   Pulse: 89   Resp: 17   Temp: 98.7 °F (37.1 °C)         RUE: in shoulder immobilizer    Dressing c/d/i     +AIN/M/R/U mot

## 2019-04-10 NOTE — PROGRESS NOTES
Acute Pain Service    Post Op Day 2 Ortho Note    Assessed patient in chair. Patient rates pain 4/10 at present - \"much better today. \" Patient states Andrzej Murray is working well to manage pain; denies itching/nausea/dizziness. No further recommendations.  Will

## 2019-04-10 NOTE — PHYSICAL THERAPY NOTE
PHYSICAL THERAPY TREATMENT NOTE - INPATIENT    Room Number: 364/364-A     Session: 1&2   Number of Visits to Meet Established Goals: 1    Presenting Problem: s/p R reverse TSR with biceps tenodesis    Problem List  Active Problems:    Primary osteoarthrit difficulty does the patient currently have. ..  -   Turning over in bed (including adjusting bedclothes, sheets and blankets)?: A Little   -   Sitting down on and standing up from a chair with arms (e.g., wheelchair, bedside commode, etc.): None   -   Movin open/close wrist flex/ext     Position Sitting     Repetitions   10   Sets   1     Patient End of Session: Up in chair;Needs met;Call light within reach;RN aware of session/findings; All patient questions and concerns addressed;SCDs in place    ASSESSMENT

## 2019-04-10 NOTE — PLAN OF CARE
Norco given for pain. Aquacel CDI, immobilizer to left shoulder. Lovenox and Coumadin given at bedtime per [facility]. Pt gets up with standby assist, NWB to RUE. VS stable. Plan to d/c home today with [place]. Bed alarm on, will monitor.       Problem: Patient/Family mobility/gait  Description  Interventions:  - Assess patient's functional ability and stability  - Promote increasing activity/tolerance for mobility and gait  - Educate and engage patient/family in tolerated activity level and precautions   Outcome: Progr and bathing  - Educate and encourage patient/family in tolerated functional activity level and precautions during self-care   Outcome: Progressing

## 2019-04-10 NOTE — PLAN OF CARE
Problem: Patient/Family Goals  Goal: Patient/Family Long Term Goal  Description  Patient's Long Term Goal: \"Be able to use my right arm more, increase my activity once I'm healed\"    Interventions:  - Follow Pt/Ot precautions and instructions, take remington Problem: PAIN - ADULT  Goal: Verbalizes/displays adequate comfort level or patient's stated pain goal  Description  INTERVENTIONS:  - Encourage pt to monitor pain and request assistance  - Assess pain using appropriate pain scale  - Administer analgesics Pain moderate after norco. RA, VSS. Aquacel to incision site, c/d/I. Ice wrap PRN. Coumadin with lovenox bridge on d/c. Plan to d/c home with [facility] later today. Will continue to monitor.

## 2019-04-11 NOTE — DISCHARGE SUMMARY
Discharge Summary  Patient ID:  Chad Gonzalez  [id number]  52 year old  12/12/1945    Admit date: 4/8/2019    Discharge date and time: 4/10/2019    Attending Physician: Francee Kocher, MD, S[place]    Reason for admission: post-operative care after right s Tab  Take 11/2 to 2 tablet(s) daily as directed by [facility] Normal, Disp-180 tablet, R-3    Insulin Syringe-Needle U-100 (BD INSULIN SYRINGE) 25G X 5/8\" 1 ML Does not apply Misc  Use with injectable methotrexate once weekly, Vanita, Dragan Cava

## 2019-07-03 PROBLEM — Z96.611 S/P REVERSE TOTAL SHOULDER ARTHROPLASTY, RIGHT: Status: ACTIVE | Noted: 2019-07-03

## 2019-12-10 PROBLEM — M85.89 OSTEOPENIA OF MULTIPLE SITES: Status: ACTIVE | Noted: 2019-01-01

## 2020-02-11 PROBLEM — Z96.611 STATUS POST REPLACEMENT OF RIGHT SHOULDER JOINT: Status: ACTIVE | Noted: 2020-01-01

## 2020-03-06 PROBLEM — D84.9 IMMUNOSUPPRESSED STATUS (HCC): Status: ACTIVE | Noted: 2020-01-01

## 2020-03-10 PROBLEM — D84.9 IMMUNOSUPPRESSED STATUS (HCC): Status: RESOLVED | Noted: 2020-01-01 | Resolved: 2020-01-01

## 2020-06-23 PROBLEM — D68.69 SECONDARY HYPERCOAGULABLE STATE (HCC): Status: ACTIVE | Noted: 2020-01-01

## 2020-07-09 PROBLEM — D84.9 IMMUNOSUPPRESSED STATUS (HCC): Status: ACTIVE | Noted: 2020-01-01

## 2020-08-19 PROBLEM — K25.4 GASTROINTESTINAL HEMORRHAGE ASSOCIATED WITH GASTRIC ULCER: Status: ACTIVE | Noted: 2020-01-01

## 2020-08-19 PROBLEM — N17.9 ACUTE RENAL FAILURE, UNSPECIFIED ACUTE RENAL FAILURE TYPE (HCC): Status: ACTIVE | Noted: 2020-01-01

## 2020-10-22 PROBLEM — Z79.52 IMMUNOSUPPRESSION DUE TO CHRONIC STEROID USE (HCC): Status: ACTIVE | Noted: 2020-01-01

## 2020-10-22 PROBLEM — T38.0X5A IMMUNOSUPPRESSION DUE TO CHRONIC STEROID USE (HCC): Status: ACTIVE | Noted: 2020-01-01

## 2020-10-22 PROBLEM — D84.821 IMMUNOSUPPRESSION DUE TO CHRONIC STEROID USE (HCC): Status: ACTIVE | Noted: 2020-01-01

## (undated) DEVICE — Device: Brand: STABLECUT®

## (undated) DEVICE — KENDALL SCD EXPRESS SLEEVES, KNEE LENGTH, MEDIUM: Brand: KENDALL SCD

## (undated) DEVICE — BLADE ELECTRODE: Brand: EDGE

## (undated) DEVICE — STERILE POLYISOPRENE POWDER-FREE SURGICAL GLOVES: Brand: PROTEXIS

## (undated) DEVICE — BONE WAX WHITE: Brand: BONE WAX WHITE

## (undated) DEVICE — SUTURE FIBERWIRE 5 AR-7211

## (undated) DEVICE — 3M™ IOBAN™ 2 ANTIMICROBIAL INCISE DRAPE 6650EZ: Brand: IOBAN™ 2

## (undated) DEVICE — SUPER SPONGES,MEDIUM: Brand: KERLIX

## (undated) DEVICE — ABDOMINAL PAD: Brand: DERMACEA

## (undated) DEVICE — NON-ADHERENT STRIPS,OIL EMULSION: Brand: CURITY

## (undated) DEVICE — CHLORAPREP 26ML APPLICATOR

## (undated) DEVICE — 1016 S-DRAPE IRRIG POUCH 10/BOX: Brand: STERI-DRAPE™

## (undated) DEVICE — ALIGNEMENT PIN-SMOOTH

## (undated) DEVICE — DERMABOND LIQUID ADHESIVE

## (undated) DEVICE — DRAPE,U/SHT,SPLIT,FILM,60X84,STERILE: Brand: MEDLINE

## (undated) DEVICE — HYDROGEN PEROXIDE SOL 4 OZ

## (undated) DEVICE — PREMIUM WET SKIN PREP TRAY: Brand: MEDLINE INDUSTRIES, INC.

## (undated) DEVICE — PROXIMATE SKIN STAPLERS (35 WIDE) CONTAINS 35 STAINLESS STEEL STAPLES (FIXED HEAD): Brand: PROXIMATE

## (undated) DEVICE — WRAP COOLING SHLDR W/ICE PILLO

## (undated) DEVICE — GOWN,SIRUS,FABRIC-REINFORCED,X-LARGE: Brand: MEDLINE

## (undated) DEVICE — HOOD, PEEL-AWAY: Brand: FLYTE

## (undated) DEVICE — SOL  .9 1000ML BTL

## (undated) DEVICE — 3M™ STERI-STRIP™ REINFORCED ADHESIVE SKIN CLOSURES, R1547, 1/2 IN X 4 IN (12 MM X 100 MM), 6 STRIPS/ENVELOPE: Brand: 3M™ STERI-STRIP™

## (undated) DEVICE — KIT TRC TRIMANO BEACH CHR ARM

## (undated) DEVICE — Device

## (undated) DEVICE — SUTURE FIBERWIRE S AR-7200

## (undated) DEVICE — Device: Brand: BLUEPRINT™ PATIENT SPECIFIC INSTRUMENTATION

## (undated) DEVICE — SUTURE MONOCRYL 3-0 PS-2

## (undated) DEVICE — INTENDED TO AID IN THE PASSING OF SUTURES THROUGH BONE AND SOFT TISSUE DURING ORTHOPEDIC SURGERY: Brand: HOFFEE SUTURE RETRIEVER

## (undated) DEVICE — 3M™ MICROFOAM™ TAPE 1528-4: Brand: 3M™ MICROFOAM™

## (undated) DEVICE — ORTHO CDS-LF: Brand: MEDLINE INDUSTRIES, INC.

## (undated) DEVICE — SUTURE VICRYL 2-0 FSL

## (undated) DEVICE — DRESSING AQUACEL AG 3.5 X 10

## (undated) DEVICE — SUTURE ETHIBOND EXCEL 2-0 SH

## (undated) DEVICE — SUTURE VICRYL 3-0 SH

## (undated) DEVICE — MEDI-VAC SUCTION HANDLE REGULAR CAPACITY: Brand: CARDINAL HEALTH

## (undated) NOTE — IP AVS SNAPSHOT
[address]            (For Outpatient Use Only) Initial Admit Date: 4/8/2019   Inpt/Obs Admit Date: Inpt: 4/8/19 / Obs: N/A   Discharge Date:    Kiya Tiffanie:  [de-identified]   MRN: [de-identified]   CSN: [id number]        ENCOUNTER  Patient C Subscriber Name:  Shelly Gongora :    Subscriber ID:  Pt Rel to Subscriber:    Hospital Account Financial Class: Medicare    April 10, 2019

## (undated) NOTE — IP AVS SNAPSHOT
Patient Demographics     Address  \[facility]\""[place]  [address] Phone  [phone] [facility]) E-mail Address  [email or web address]      Emergency Contact(s)     Name Relation Home Work [address] Son   [phone]    Celso Timmons Allow enough slack to place two fingers under the cuff, you do not want to stop the circulation to the lower arm.       #5  There is a finger loop/support pocket on the chest strap for your fingers when you are walking around   Also a pillow behind the elbo ? Take pain medication as prescribed with food, allowing 30-45 minutes to take effect. ? Do not drink alcohol while on pain medication. ? Keep pain manageable; pain should not disrupt your sleep or activities like getting out of bed or walking.   ? As you ? Sutures/staples will be removed at first office visit                                                               (10 days- 3 weeks). Body changes  ? Constipation – common with the use of narcotics. ?  Use stool softeners such as Colace or Senokot w ? Temperature >101F, on 2 subsequent readings. ? Increased pain at incision not relieved by pain medication. ?  Your surgeon would like to be informed of any incision questions you have before you go to the ER, you can contact Dr. Megan Buenrostro at [phone] Stop taking on:  4/16/2019   Stefanie Khalil MD         folic acid 1 MG Tabs  Commonly known as:  FOLVITE      Take 1 tablet (1 mg total) by mouth daily.    Macario Gallardo MD         furosemide 20 MG Tabs  Commonly known as:  LASIX      TAKE ONE TABLET BY M Please  your prescriptions at the location directed by your doctor or nurse    Bring a paper prescription for each of these medications  Enoxaparin Sodium 40 MG/0.4ML Soln  HYDROcodone-acetaminophen 5-325 MG Tabs           [id number] - MAR ACTION REP [id number] magnesium oxide (MAG-OX) tab 250 mg 04/09/19 2057 Given      [id number] vitamin E cap 400 Units 04/10/19 0847 Given            LEFT LOWER ABDOMEN     Order ID Medication Name Action Time Action Reason Comments    [id number] Enoxaparin Sodium (LOVE Ordering provider:   Kenji Jimenez MD  04/09/19 7853 Resulting lab:  [facility]    Specimen Information    Type Source Collected On   Blood — 04/10/19 6084          Components    Component Value Reference Range Flag Lab   Magnesium 2.3 1.6 - 2.6 mg/d H&P - H&P Note      H&P signed by Jeferson Martínez MD at 4/8/2019  6:50 AM  Version 1 of 1    Author:  Jeferson Martínez MD Service:  Orthopedics Author Type:  Physician    Filed:  4/8/2019  6:50 AM Date of Service:  4/8/2019  6:49 AM Status:  Signed Erythromycin Base       RASH     Past Medical History:  Past Medical History:   Diagnosis Date   • High blood pressure     • HYPERTENSION     • OBESITY     • Osteoarthritis     • OTHER DISEASES       Stenosis aortic valve   • Rheumatoid arthritis (HCC)   • Heart Disorder Brother           a Fib          REVIEW OF SYSTEMS  Comprehensive review of systems negative unless as above     Constitutional:  Denies fever, chills or weight loss   HEENT:  Denies sore throat or acute eye problems   Respiratory:  Denies WBC 4.0 - 11.0 x10(3) uL 4.3    RBC 3.80 - 5.30 x10(6)uL 3.56 Abnormally low     HGB 12.0 - 16.0 g/dL 11.3 Abnormally low     HCT 35.0 - 48.0 % 35.7    MCV 80.0 - 100.0 fL 100.3 Abnormally high     MCH 26.0 - 34.0 pg 31.7    MCHC 31.0 - 37.0 g/dL 31.7    R today which would involve doing a reverse total shoulder arthroplasty.   I explained to her that there are 2 kinds of arthroplasty, anatomic and reverse, however there is a high likelihood based on physical exam and imaging that her rotator cuff is complete Consults - MD Consult Notes      Consults signed by Lizbet Humphreys MD at 4/8/2019  6:40 PM     Author:  Lizbet Humphreys MD Service:  Hospitalist Author Type:  Physician    Filed:  4/8/2019  6:40 PM Date of Service:  4/8/2019  6:34 PM HOME MEDS    Vitamin D (Cholecalciferol) 400 Units Oral Daily   Calcium Carbonate-Vitamin D 1 tablet Oral BID   vitamin E 400 Units Oral Daily   magnesium oxide 250 mg Oral Daily   Vitamin B-12 100 mcg Oral Daily   Fluticasone Propionate 1 spray Nasal Nikki Lungs: CTAB, no wheezes  Abd: s/nt/nd, +bs  LE: no c/e/e  Neuro: CN 2-12 intact, no focal deficits      Diagnostics:   CBC/Chem  Recent Labs   Lab 04/08/19  0603   INR 0.90       No results for input(s): NA, K, CL, CO2, BUN, CREATSERUM, GLU, CA, CAION, MG, ASSESSMENT / PLAN:    68year old female with PMH including but not limited to HTN, RA who p/t EH for scheduled surgery by Dr. Andrea Louise    #[CY. 1] Right Shoulder Severe Rotator Cuff Arthropathy; Rheumatoid Arthritis of Right Shoulder[CY.2]  -S/P[CY. 1] RIGHT Physical Therapy Note signed by Mala Dent PT at 4/9/2019  5:17 PM  Version 1 of 1    Author:  Mala Dent PT Service:  [facility] Author Type:  Physical Therapist    Filed:  4/9/2019  5:17 PM Date of Service:  4/9/2019  5:01 PM Status:  Signed     Prior Level of [place]: per pt she was ind with all ADLs and ambulation PTA. SUBJECTIVE  \"It hurts right now. \"    Patient self-stated goal is to be independent again.     OBJECTIVE  Precautions: (per ortho MD, no active IR, or adduction; flexion t Approx Degree of Impairment: 35.83%   Standardized Score (AM-PAC Scale): 47.67   CMS Modifier (G-Code): CJ    FUNCTIONAL ABILITY STATUS  Gait Assessment   Gait Assistance: Supervision  Distance (ft): 200  Assistive Device: None  Pattern: Within Functional dynamic standing balance, pain. Functional outcome measures completed include AMPAC. Based on this evaluation, patient's clinical presentation is stable and overall the evaluation complexity is considered low.   These impairments and comorbidities manifes OCCUPATIONAL THERAPY TREATMENT NOTE - INPATIENT     Room Number: 364/364-A  Session: 1   Number of Visits to Meet Established Goals: 2    Presenting Problem: s/p R reverse TSA, R open biceps tenodesis 4/8/19    History related to current admission: Patient ACTIVITIES OF DAILY LIVING ASSESSMENT  AM-PAC ‘6-Clicks’ Inpatient Daily Activity Short Form  How much help from another person does the patient currently need…  -   Putting on and taking off regular lower body clothing?: A Little(supervision)  -   Bathing bed mobility technique, bed mobility via SBA with good return demo following NWB with HOB elevated, although reports will sleep in reclining sofa initially.  Patient also educated on OT role, safety, fall prevention, pain maagement, shower transfers (reinfo caregiver not present, PT will address later today when daughter present[BW.1]     Attribution Zuniga    BW.1 - Jasvir Carlson OT on 4/10/2019  8:56 AM               Occupational Therapy Note signed by Jasvir Carlson OT at 4/9/2019  1:24 PM  Version • OTHER SURGICAL HISTORY      VEIN REMOVAL (RIGHT LEG)[BW.2]       OCCUPATIONAL PROFILE    HOME SITUATION[BW.1]  Type of Home: House  Home Layout: Multi-level  Lives With: Alone(daughter will stay initially)    Toilet and Equipment: Standard height toilet; ACTIVITIES OF DAILY LIVING ASSESSMENT  AM-PAC ‘6-Clicks’ Inpatient Daily Activity Short Form  How much help from another person does the patient currently need…[BW.1]  -   Putting on and taking off regular lower body clothing?: A Little  - educated on OT role, safety, fall prevention, pain management, shower transfers with good verbal understanding. Patient End of Session: Up in chair;Needs met;Call light within reach; All patient questions and concerns addressed;SCDs in place; Ice applied training; Endurance training;Patient/Family education;Patient/Family training; Compensatory technique education  Rehab Potential : Good  Frequency (Obs): 5x/week  Number of Visits to Meet Established Goals: 2[BW. 2]    ADL GOALS   Patient will perform lower b Tb Intradermal Test 09/05/18       Future Appointments        Provider Umer Pulido    4/17/2019 9:20 AM Cherrie Juarez MD [facility]    4/17/2019 10:30 AM Sendy Herrera MD [facility]